# Patient Record
Sex: FEMALE
[De-identification: names, ages, dates, MRNs, and addresses within clinical notes are randomized per-mention and may not be internally consistent; named-entity substitution may affect disease eponyms.]

---

## 2018-06-12 ENCOUNTER — HOSPITAL ENCOUNTER (EMERGENCY)
Dept: HOSPITAL 42 - ED | Age: 48
LOS: 1 days | Discharge: ANOTHER HOSPITAL | End: 2018-06-13
Payer: MEDICAID

## 2018-06-12 VITALS — BODY MASS INDEX: 25.2 KG/M2

## 2018-06-12 DIAGNOSIS — F31.9: Primary | ICD-10-CM

## 2018-06-12 DIAGNOSIS — F17.210: ICD-10-CM

## 2018-06-12 LAB
ALBUMIN SERPL-MCNC: 4.3 G/DL (ref 3–4.8)
ALBUMIN/GLOB SERPL: 1.6 {RATIO} (ref 1.1–1.8)
ALT SERPL-CCNC: 28 U/L (ref 7–56)
APAP SERPL-MCNC: < 10 UG/ML (ref 10–20)
APPEARANCE UR: CLEAR
AST SERPL-CCNC: 21 U/L (ref 14–36)
BACTERIA #/AREA URNS HPF: (no result) /[HPF]
BASOPHILS # BLD AUTO: 0 K/MM3 (ref 0–2)
BASOPHILS NFR BLD: 0 % (ref 0–3)
BILIRUB UR-MCNC: (no result) MG/DL
BUN SERPL-MCNC: 10 MG/DL (ref 7–21)
CALCIUM SERPL-MCNC: 9.1 MG/DL (ref 8.4–10.5)
COLOR UR: YELLOW
EOSINOPHIL # BLD: 0 10*3/UL (ref 0–0.7)
EOSINOPHIL NFR BLD: 0.8 % (ref 1.5–5)
ERYTHROCYTE [DISTWIDTH] IN BLOOD BY AUTOMATED COUNT: 12.9 % (ref 11.5–14.5)
GFR NON-AFRICAN AMERICAN: > 60
GLUCOSE UR STRIP-MCNC: NEGATIVE MG/DL
GRANULOCYTES # BLD: 3.19 10*3/UL (ref 1.4–6.5)
GRANULOCYTES NFR BLD: 61 % (ref 50–68)
HCG,QUALITATIVE URINE: NEGATIVE
HGB BLD-MCNC: 12.4 G/DL (ref 12–16)
LEUKOCYTE ESTERASE UR-ACNC: NEGATIVE LEU/UL
LYMPHOCYTES # BLD: 1.5 10*3/UL (ref 1.2–3.4)
LYMPHOCYTES NFR BLD AUTO: 28 % (ref 22–35)
MCH RBC QN AUTO: 29.6 PG (ref 25–35)
MCHC RBC AUTO-ENTMCNC: 35.1 G/DL (ref 31–37)
MCV RBC AUTO: 84.2 FL (ref 80–105)
MONOCYTES # BLD AUTO: 0.5 10*3/UL (ref 0.1–0.6)
MONOCYTES NFR BLD: 10.2 % (ref 1–6)
PH UR STRIP: 6 [PH] (ref 4.7–8)
PLATELET # BLD: 241 10^3/UL (ref 120–450)
PMV BLD AUTO: 10 FL (ref 7–11)
PROT UR STRIP-MCNC: (no result) MG/DL
RBC # BLD AUTO: 4.19 10^6/UL (ref 3.5–6.1)
RBC # UR STRIP: NEGATIVE /UL
RBC #/AREA URNS HPF: (no result) /HPF (ref 0–2)
SALICYLATES SERPL-MCNC: < 1 MG/DL (ref 2–20)
SP GR UR STRIP: >= 1.03 (ref 1–1.03)
UROBILINOGEN UR STRIP-ACNC: 0.2 E.U./DL
WBC # BLD AUTO: 5.2 10^3/UL (ref 4.5–11)
WBC #/AREA URNS HPF: (no result) /HPF (ref 0–6)

## 2018-06-12 PROCEDURE — 80329 ANALGESICS NON-OPIOID 1 OR 2: CPT

## 2018-06-12 PROCEDURE — 93005 ELECTROCARDIOGRAM TRACING: CPT

## 2018-06-12 PROCEDURE — 96372 THER/PROPH/DIAG INJ SC/IM: CPT

## 2018-06-12 PROCEDURE — 71045 X-RAY EXAM CHEST 1 VIEW: CPT

## 2018-06-12 PROCEDURE — 83992 ASSAY FOR PHENCYCLIDINE: CPT

## 2018-06-12 PROCEDURE — 80053 COMPREHEN METABOLIC PANEL: CPT

## 2018-06-12 PROCEDURE — 80345 DRUG SCREENING BARBITURATES: CPT

## 2018-06-12 PROCEDURE — 83735 ASSAY OF MAGNESIUM: CPT

## 2018-06-12 PROCEDURE — 82550 ASSAY OF CK (CPK): CPT

## 2018-06-12 PROCEDURE — 80324 DRUG SCREEN AMPHETAMINES 1/2: CPT

## 2018-06-12 PROCEDURE — 80361 OPIATES 1 OR MORE: CPT

## 2018-06-12 PROCEDURE — 80320 DRUG SCREEN QUANTALCOHOLS: CPT

## 2018-06-12 PROCEDURE — 80353 DRUG SCREENING COCAINE: CPT

## 2018-06-12 PROCEDURE — 99285 EMERGENCY DEPT VISIT HI MDM: CPT

## 2018-06-12 PROCEDURE — 80358 DRUG SCREENING METHADONE: CPT

## 2018-06-12 PROCEDURE — 90791 PSYCH DIAGNOSTIC EVALUATION: CPT

## 2018-06-12 PROCEDURE — 80349 CANNABINOIDS NATURAL: CPT

## 2018-06-12 PROCEDURE — 85025 COMPLETE CBC W/AUTO DIFF WBC: CPT

## 2018-06-12 PROCEDURE — 80346 BENZODIAZEPINES1-12: CPT

## 2018-06-12 PROCEDURE — 84703 CHORIONIC GONADOTROPIN ASSAY: CPT

## 2018-06-12 PROCEDURE — 81001 URINALYSIS AUTO W/SCOPE: CPT

## 2018-06-12 NOTE — ED PDOC
Arrival/HPI





- General


Chief Complaint: Psychiatric Evaluation


Time Seen by Provider: 06/12/18 10:15





- History of Present Illness


Narrative History of Present Illness (Text): 





06/12/18 10:42


49 yo F with PMH of endometrial CA, skin CA, opioid abuse on suboxone, and IBS 

presents to emergency department accompanied by aKtt WARREN and EMS after being 

found trespassing. Patient states that she's had multiple stressors in her life 

including poor job stability and tension between her kids. Patient denies any 

suicidal/homicidal ideation or visual/auditory hallucinations. Patient states 

that she had not slept for 3 days and was about to come to the hospital because 

she wanted to get some sleep. Before her partner could drive her to the hospital

, she ran off and was found trespassing and being disruptive in a laundry room. 

Patient denies chest pain, shortness of breath nausea, vomiting, diarrhea, 

abdominal pain, fever, chills, headache, or dizziness.





PMD: Fili Guzman)





Past Medical History





- Infectious Disease


Hx of Infectious Diseases: None





- Cardiac


Hx Cardiac Disorders: No





- Pulmonary


Hx Respiratory Disorders: No





- Neurological


Hx Neurological Disorder: No





- HEENT


Hx HEENT Disorder: No





- Renal


Hx Renal Disorder: No





- Endocrine/Metabolic


Hx Endocrine Disorders: No





- Hematological/Oncological


Hx Blood Disorders: No





- Integumentary


Hx Dermatological Disorder: Yes


Other/Comment: Skin Cancer





- Musculoskeletal/Rheumatological


Hx Musculoskeletal Disorders: Yes


Hx Back Pain: Yes


Other/Comment: "bad knees"





- Gastrointestinal


Hx Gastrointestinal Disorders: Yes


Hx Diverticulitis: Yes





- Genitourinary/Gynecological


Hx Genitourinary Disorders: Yes


Other/Comment: "Weak Bladder".  Endometrial Cancer





- Psychiatric


Hx Psychophysiologic Disorder: Yes


Hx Anxiety: Yes


Hx Depression: Yes


Hx Panic Disorder: Yes (Panic Attack)


Hx Substance Use: No





- Surgical History


Hx Cholecystectomy: Yes


Other/Comment: Right knee sx





- Anesthesia


Hx Anesthesia: Yes


Hx Anesthesia Reactions: No


Hx Malignant Hyperthermia: No





Family/Social History


Family/Social History: Other (depression/anxiety)


Smoking Status: Light Smoker < 10 Cigarettes Daily


Hx Alcohol Use: No


Hx Substance Use: No





Allergies/Home Meds


Allergies/Adverse Reactions: 


Allergies





No Known Allergies Allergy (Verified 06/19/16 02:42)


 








Home Medications: 


 Home Meds











 Medication  Instructions  Recorded  Confirmed


 


Buprenorphine HCl/Naloxone HCl 1 film PO DAILY 06/19/16 06/12/18





[Suboxone 2 mg-0.5 mg Sl Film]   


 


ALPRAZolam [Xanax] 1 mg PO TID PRN 06/12/18 06/12/18


 


Linaclotide [Linzess] 290 mcg PO DAILY 06/12/18 06/12/18


 


Tolterodine Tartrate [Detrol LA] 4 mg PO DAILY 06/12/18 06/12/18














Review of Systems





- Physician Review


All systems were reviewed & negative as marked: Yes (12 point ROS reviewed and 

is negative other than what is stated in HPI.)





Physical Exam


Vital Signs Reviewed: Yes


Temperature: Afebrile


Blood Pressure: Normal


Pulse: Regular


Respiratory Rate: Normal


Appearance: Positive for: Non-Toxic


Pain Distress: None


Mental Status: Positive for: Alert and Oriented X 3, Agitated





- Systems Exam


Head: Present: Atraumatic, Normocephalic


Pupils: Present: PERRL


Extroacular Muscles: Present: EOMI


Conjunctiva: Present: Normal


Mouth: Present: Moist Mucous Membranes


Neck: Present: Normal Range of Motion


Respiratory/Chest: Present: Clear to Auscultation.  No: Wheezes, Rales, Rhonchi


Cardiovascular: Present: Regular Rate and Rhythm, Normal S1, S2.  No: Murmurs, 

Rub, Gallop


Abdomen: No: Tenderness, Distention, Rebound, Guarding


Upper Extremity: Present: Normal Inspection.  No: Cyanosis, Edema


Lower Extremity: Present: Normal Inspection.  No: Edema


Neurological: Present: GCS=15, CN II-XII Intact, Speech Normal


Skin: Present: Warm, Dry, Normal Color.  No: Rashes


Psychiatric: Present: Alert, Oriented x 3, Agitated, Depressed Mood, Other (

Tangential thinking).  No: Normal Insight, Normal Concentration, Suicidal 

Ideation, Homicidal Ideation, Hallucinations


Vital Signs











  Temp Pulse Resp BP Pulse Ox


 


 06/13/18 20:20   80  17  110/70  100


 


 06/13/18 18:34  97.9 F    


 


 06/13/18 18:28   98 H  18  98/78 L  99


 


 06/13/18 04:32   61  16  117/71  100


 


 06/13/18 01:58   60  12  112/62  100


 


 06/12/18 19:33   83  18  121/73  99


 


 06/12/18 17:50   74  18  122/68  99


 


 06/12/18 15:39   72  18  128/74  100


 


 06/12/18 13:00   75  18  121/71  100


 


 06/12/18 10:22  98.4 F  89  18  119/65  100














Medical Decision Making


ED Course and Treatment: 








06/12/18 18:04


Patient seen and evaluated with medical resident. I examined patient and 

reviewed history with patient and consults.


Patient admits to me "I admit I used some ecstasy". She denies headache or 

chest pain or shortness of breath. She is afebrile. She denies dysuria or 

frequency.


With patient's permission, patient's significant other brought patient to 

Emergency department and history reviewed with him. Patient states to me that 

"my daughter called the police I'm not sure why". It is reported that she has 

been agitated and intermittently aggressive and pressure.


Throughout Emergency department stay she is noted to be intermittently 

screaming at staff and entering offices despite being verbally updated on 

treatment plan which includes mental health evaluation and screening given 

history of agitation and aggressive behavior. I re-evaluated patient at 1745 

and was able to speak to her, she became more calm and cooperative, RESTRAINTS 

AT THIS TIME WERE THUS NOT PLACED, although will continue one to one for 

reported aggressive behavior and agitation, potential harm to self in agitated 

state.





Case to be endorsed to oncoming Emergency department physician for follow-up of 

screener recommendations and final disposition. (Bindu Graham)





06/12/18 10:56


49 yo F presents to emergency department for psychiatric evaluation.





Plan:


- CBC, CMP


- EtOH level


- CPK


- ASA, tylenol level


- UDS


- UA


- Urine pregnancy


- Chest X-ray


- EKG


- Reassess and disposition





EKG reviewed and shoed rate of 81. NSR. Left axis deviation. Inferior infarct 

age undetermined





06/12/18 11:21


Potassium 3.3, repleted with PO KCl.





CXR reviewed by radiologist showed no active disease.





06/12/18 14:24


Patient is medically cleared for psych admission and transfer. 





[facility] evaluated patient and is candidate for admission, however, patient 

currently refusing voluntary psychiatric admission. Information faxed to [facility] for evaluation for involuntary psychiatric admission.





06/12/18 17:41


Patient starting to become agitated and aggressive and was continuously pacing 

around the emergency department. Patient began entering restricted areas and 

was asked to remain in her room. However, patient was non-compliant. Thus, 

restraints and 1:1 ordered.


 (Fili Cross)





- Lab Interpretations


Lab Results: 








 06/12/18 10:45 





 06/12/18 10:45 





 Lab Results





06/12/18 11:05: Urine Opiates Screen Negative, Urine Methadone Screen Negative, 

Ur Barbiturates Screen Negative, Ur Phencyclidine Scrn Negative, Ur 

Amphetamines Screen Positive H, U Benzodiazepines Scrn Positive H, U Oth 

Cocaine Metabols Negative, U Cannabinoids Screen Negative


06/12/18 11:05: Urine Color Yellow, Urine Appearance Clear, Urine pH 6.0, Ur 

Specific Gravity >= 1.030, Urine Protein Trace H, Urine Glucose (UA) Negative, 

Urine Ketones 40 H, Urine Blood Negative, Urine Nitrate Negative, Urine 

Bilirubin Small H, Urine Urobilinogen 0.2, Ur Leukocyte Esterase Negative, 

Urine RBC 0 - 2, Urine WBC 0 - 2, Ur Epithelial Cells 1 - 3, Urine Bacteria Mod

, Urine HCG, Qual Negative


06/12/18 10:45: Alcohol, Quantitative < 10


06/12/18 10:45: Salicylates < 1 L, Acetaminophen < 10.0 L


06/12/18 10:45: Sodium 140, Potassium 3.3 L, Chloride 102, Carbon Dioxide 26, 

Anion Gap 16, BUN 10, Creatinine 0.6 L, Est GFR ( Amer) > 60, Est GFR (

Non-Af Amer) > 60, Random Glucose 113 H, Calcium 9.1, Magnesium 1.7, Total 

Bilirubin 1.2, AST 21, ALT 28, Alkaline Phosphatase 65, Total Creatine Kinase 86

, Total Protein 6.9, Albumin 4.3, Globulin 2.7, Albumin/Globulin Ratio 1.6


06/12/18 10:45: WBC 5.2, RBC 4.19, Hgb 12.4, Hct 35.3 L, MCV 84.2, MCH 29.6, 

MCHC 35.1, RDW 12.9, Plt Count 241, MPV 10.0, Gran % 61.0, Lymph % (Auto) 28.0, 

Mono % (Auto) 10.2 H, Eos % (Auto) 0.8 L, Baso % (Auto) 0.0, Gran # 3.19, Lymph 

# (Auto) 1.5, Mono # (Auto) 0.5, Eos # (Auto) 0.0, Baso # (Auto) 0.00











- RAD Interpretation


Radiology Orders: 








06/12/18 10:50


CHEST PORTABLE [RAD] Stat 














- Medication Orders


Current Medication Orders: 











Discontinued Medications





Diphenhydramine HCl (Benadryl)  50 mg IM Q6H PRN


   PRN Reason: agitation/aggression


Haloperidol Lactate (Haldol)  5 mg IM STAT STA


   PRN Reason: Protocol


   Stop: 06/13/18 04:54


   Last Admin: 06/13/18 05:40  Dose: 5 mg





IM Administration Charges


 Document     06/13/18 05:40  CNR  (Rec: 06/13/18 05:40  CNR  [id number])


     Injection Site


      MAR Injection Site                         Right Deltoid


     Charges for Administration


      # of IM Administrations                    1





Haloperidol Lactate (Haldol)  5 mg IM Q6H PRN; Protocol


   PRN Reason: agitation/psychosis


Lorazepam (Ativan)  2 mg IM ONCE ONE


   Stop: 06/13/18 04:53


   Last Admin: 06/13/18 05:40  Dose: 2 mg





IM Administration Charges


 Document     06/13/18 05:40  CNR  (Rec: 06/13/18 05:40  CNR  [id number])


     Injection Site


      MAR Injection Site                         Right Deltoid


     Charges for Administration


      # of IM Administrations                    1





Lorazepam (Ativan)  2 mg IM Q6 PRN; Protocol


   PRN Reason: Agitation


Potassium Chloride (K-Dur 20 Meq Er Tab)  40 meq PO STAT STA


   Stop: 06/12/18 11:19


   Last Admin: 06/12/18 13:26  Dose: 40 meq





Ziprasidone (Geodon Inj)  20 mg IM ONCE STA


   Stop: 06/12/18 20:43


   Last Admin: 06/12/18 20:48  Dose: 20 mg





IM Administration Charges


 Document     06/12/18 20:48  CNR  (Rec: 06/12/18 20:48  CNR  [id number])


     Injection Site


      MAR Injection Site                         Left Deltoid


     Charges for Administration


      # of IM Administrations                    1














Disposition/Present on Arrival





- Present on Arrival


Any Indicators Present on Arrival: No


History of DVT/PE: No


History of Uncontrolled Diabetes: No


Urinary Catheter: No


History of Decub. Ulcer: No


History Surgical Site Infection Following: None





- Disposition


Have Diagnosis and Disposition been Completed?: Yes


Disposition Time: 19:00





- Disposition


Diagnosis: 


 Bipolar disorder





Disposition: Transfer [facility]


Condition: STABLE


Forms:  [facility] (English)

## 2018-06-12 NOTE — CARD
--------------- APPROVED REPORT --------------





EKG Measurement

Heart [id number]

PA [id number]

[id number]

[id number]

[id number]



<Conclusion>

Normal sinus rhythm

Left axis deviation

Inferior infarct, age undetermined

Abnormal ECG

## 2018-06-12 NOTE — RAD
HISTORY:

medical screening  



COMPARISON:

08/05/2016 



FINDINGS:



LUNGS:

No active pulmonary disease.



PLEURA:

No significant pleural effusion identified, no pneumothorax apparent.



CARDIOVASCULAR:

Normal.



OSSEOUS STRUCTURES:

No significant abnormalities.



VISUALIZED UPPER ABDOMEN:

Normal.



OTHER FINDINGS:

None.



IMPRESSION:

No active disease.

## 2018-06-12 NOTE — ED PDOC
Arrival/HPI





- General


Time Seen by Provider: 06/12/18 10:15


Historian: Patient





Past Medical History





- Provider Review


Nursing Documentation Reviewed: Yes





- Infectious Disease


Hx of Infectious Diseases: None





- Musculoskeletal/Rheumatological


Hx Back Pain: Yes


Other/Comment: "bad knees"





- Gastrointestinal


Hx Diverticulitis: Yes





- Genitourinary/Gynecological


Other/Comment: "Weak Bladder"





- Psychiatric


Hx Substance Use: No





- Surgical History


Hx Cholecystectomy: Yes


Other/Comment: Right knee sx





- Anesthesia


Hx Anesthesia: Yes


Hx Anesthesia Reactions: No


Hx Malignant Hyperthermia: No





Family/Social History





- Physician Review


Nursing Documentation Reviewed: Yes


Family/Social History: Unknown Family HX


Smoking Status: Light Smoker < 10 Cigarettes Daily


Hx Alcohol Use: No


Hx Substance Use: No





Allergies/Home Meds


Allergies/Adverse Reactions: 


Allergies





No Known Allergies Allergy (Verified 06/19/16 02:42)


 








Home Medications: 


 Home Meds











 Medication  Instructions  Recorded  Confirmed


 


Buprenorphine HCl/Naloxone HCl 1 film PO DAILY 06/19/16 06/19/16





[Suboxone 2 mg-0.5 mg Sl Film]   


 


Mirabegron [Myrbetriq] 1 tab PO DAILY 06/19/16 06/19/16


 


clonazePAM [Klonopin] 1 tab PO BID 06/19/16 06/19/16














Physical Exam





- Physical Exam


Narrative Physical Exam (Text): 





06/12/18


Head:  Atraumatic.  Normocephalic. 


Eyes:  PERRL.  EOMI.  Conjunctivae are not pale.


ENT:  Mucous membranes are moist and intact.  Oropharynx is clear and 

symmetric. 


Neck:  Supple.  Full ROM.  No JVD.  No lymphadenopathy.


Cardiovascular:  Regular rate.  Regular rhythm.  No murmurs, rubs, or gallops.  

Distal pulses are 2+ and symmetric.


Pulmonary/Chest:  No evidence of respiratory distress.  Clear to auscultation 

bilaterally.  No wheezing, rales or rhonchi.


Abdominal:  Soft and non-distended.  There is no tenderness.  No rebound, 

guarding, or rigidity.  No organomegaly.  Good bowel sounds. 


Back:  No CVA tenderness.


Extremities:  No edema.   No cyanosis.  No clubbing.  Full range of motion in 

all extremities.  No calf tenderness.


Skin:  Skin is warm and dry.  No petechiae.  No purpura. 


Neurological:  Alert, awake, and oriented to person, place, time, and 

situation.  Normal speech. 


Psychiatric:  Good eye contact.  Normal interaction, affect, and behavior.


 





- Scribe Statement


The provider has reviewed the documentation as recorded by the Erickaibe


Sun Ugarte





Provider Scribe Attestation:


All medical record entries made by the Scribe were at my direction and 

personally dictated by me. I have reviewed the chart and agree that the record 

accurately reflects my personal performance of the history, physical exam, 

medical decision making, and the department course for this patient. I have 

also personally directed, reviewed, and agree with the discharge instructions 

and disposition.





Disposition/Present on Arrival





- Present on Arrival


History of DVT/PE: No


History of Uncontrolled Diabetes: No


Urinary Catheter: No


History Surgical Site Infection Following: None





- Disposition

## 2018-06-12 NOTE — ED PDOC
Physical Exam


Vital Signs











  Temp Pulse Resp BP Pulse Ox


 


 06/13/18 04:32   61  16  117/71  100


 


 06/13/18 01:58   60  12  112/62  100


 


 06/12/18 19:33   83  18  121/73  99


 


 06/12/18 17:50   74  18  122/68  99


 


 06/12/18 15:39   72  18  128/74  100


 


 06/12/18 13:00   75  18  121/71  100


 


 06/12/18 10:22  98.4 F  89  18  119/65  100














Medical Decision Making


ED Course and Treatment: 


06/12/18 19:25


Patient endorsed to me by . Pending evaluation by [facility] screeners. Patient 

apparently presented to emergency department post ecstasy use. Patient 

apparently was found trespassing and exhibiting disruptive behaviour. Patient 

was medically cleared and was seen by [facility] who deferred to [facility] screeners. 

Patient is currently resting comfortably and under evaluation by [facility] 

screeners. 





06/12/18 20:43


Patient was accepted by [facility] for transfer.  Patient became belligerent and 

aggressive to ER staff, attempting to abscond.  patient is screaming, yelling, 

and swinging at medical staff requiring her to be restrained. Sedation also 

ordered for the patient.





06/12/18 21:39


Pt. is awaiting bed availability at [facility].Currently remains sedated.





06/12/19 05:00


Pt. became agitated,yelling and cursing ER staff.Unable to verbally reason with 

patient she was administered sedation. 





06/13/18 07:00


Patient resting comfortably.Endorsed to oncoming attending /awaiting bed 

availability at [facility]











- Lab Interpretations


Lab Results: 








 06/12/18 10:45 





 06/12/18 10:45 





 Lab Results





06/12/18 11:05: Urine Opiates Screen Negative, Urine Methadone Screen Negative, 

Ur Barbiturates Screen Negative, Ur Phencyclidine Scrn Negative, Ur 

Amphetamines Screen Positive H, U Benzodiazepines Scrn Positive H, U Oth 

Cocaine Metabols Negative, U Cannabinoids Screen Negative


06/12/18 11:05: Urine Color Yellow, Urine Appearance Clear, Urine pH 6.0, Ur 

Specific Gravity >= 1.030, Urine Protein Trace H, Urine Glucose (UA) Negative, 

Urine Ketones 40 H, Urine Blood Negative, Urine Nitrate Negative, Urine 

Bilirubin Small H, Urine Urobilinogen 0.2, Ur Leukocyte Esterase Negative, 

Urine RBC 0 - 2, Urine WBC 0 - 2, Ur Epithelial Cells 1 - 3, Urine Bacteria Mod

, Urine HCG, Qual Negative


06/12/18 10:45: Alcohol, Quantitative < 10


06/12/18 10:45: Salicylates < 1 L, Acetaminophen < 10.0 L


06/12/18 10:45: Sodium 140, Potassium 3.3 L, Chloride 102, Carbon Dioxide 26, 

Anion Gap 16, BUN 10, Creatinine 0.6 L, Est GFR ( Amer) > 60, Est GFR (

Non-Af Amer) > 60, Random Glucose 113 H, Calcium 9.1, Magnesium 1.7, Total 

Bilirubin 1.2, AST 21, ALT 28, Alkaline Phosphatase 65, Total Creatine Kinase 86

, Total Protein 6.9, Albumin 4.3, Globulin 2.7, Albumin/Globulin Ratio 1.6


06/12/18 10:45: WBC 5.2, RBC 4.19, Hgb 12.4, Hct 35.3 L, MCV 84.2, MCH 29.6, 

MCHC 35.1, RDW 12.9, Plt Count 241, MPV 10.0, Gran % 61.0, Lymph % (Auto) 28.0, 

Mono % (Auto) 10.2 H, Eos % (Auto) 0.8 L, Baso % (Auto) 0.0, Gran # 3.19, Lymph 

# (Auto) 1.5, Mono # (Auto) 0.5, Eos # (Auto) 0.0, Baso # (Auto) 0.00











- RAD Interpretation


Radiology Orders: 








06/12/18 10:50


CHEST PORTABLE [RAD] Stat 














- Medication Orders


Current Medication Orders: 











Discontinued Medications





Haloperidol Lactate (Haldol)  5 mg IM STAT STA


   PRN Reason: Protocol


   Stop: 06/13/18 04:54


   Last Admin: 06/13/18 05:40  Dose: 5 mg





IM Administration Charges


 Document     06/13/18 05:40  CNR  (Rec: 06/13/18 05:40  CNR  [id number])


     Injection Site


      MAR Injection Site                         Right Deltoid


     Charges for Administration


      # of IM Administrations                    1





Lorazepam (Ativan)  2 mg IM ONCE ONE


   Stop: 06/13/18 04:53


   Last Admin: 06/13/18 05:40  Dose: 2 mg





IM Administration Charges


 Document     06/13/18 05:40  CNR  (Rec: 06/13/18 05:40  CNR  [id number])


     Injection Site


      MAR Injection Site                         Right Deltoid


     Charges for Administration


      # of IM Administrations                    1





Potassium Chloride (K-Dur 20 Meq Er Tab)  40 meq PO STAT STA


   Stop: 06/12/18 11:19


   Last Admin: 06/12/18 13:26  Dose: 40 meq





Ziprasidone (Geodon Inj)  20 mg IM ONCE STA


   Stop: 06/12/18 20:43


   Last Admin: 06/12/18 20:48  Dose: 20 mg





IM Administration Charges


 Document     06/12/18 20:48  CNR  (Rec: 06/12/18 20:48  CNR  [id number])


     Injection Site


      MAR Injection Site                         Left Deltoid


     Charges for Administration


      # of IM Administrations                    1














- Scribe Statement


The provider has reviewed the documentation as recorded by the Scribe


Louie Diaz


Provider Scribe Attestation:


All medical record entries made by the Scribe were at my direction and 

personally dictated by me. I have reviewed the chart and agree that the record 

accurately reflects my personal performance of the history, physical exam, 

medical decision making, and the department course for this patient. I have 

also personally directed, reviewed, and agree with the discharge instructions 

and disposition. 





Disposition/Present on Arrival





- Present on Arrival


Any Indicators Present on Arrival: No


History of DVT/PE: No


History of Uncontrolled Diabetes: No


Urinary Catheter: No


History of Decub. Ulcer: No


History Surgical Site Infection Following: None





- Disposition


Have Diagnosis and Disposition been Completed?: No


Diagnosis: 


 Bipolar disorder





Disposition Time: 07:00


Patient Problems: 


 Current Active Problems











Problem Status Onset


 


Bipolar disorder Acute  











Condition: STABLE


Forms:  [facility] (English)

## 2018-06-13 VITALS
SYSTOLIC BLOOD PRESSURE: 110 MMHG | HEART RATE: 80 BPM | DIASTOLIC BLOOD PRESSURE: 70 MMHG | RESPIRATION RATE: 17 BRPM | OXYGEN SATURATION: 100 %

## 2018-06-13 VITALS — TEMPERATURE: 97.9 F

## 2018-06-13 NOTE — ED PDOC
Physical Exam


Vital Signs











  Temp Pulse Resp BP Pulse Ox


 


 06/13/18 18:34  97.9 F    


 


 06/13/18 18:28   98 H  18  98/78 L  99


 


 06/13/18 04:32   61  16  117/71  100


 


 06/13/18 01:58   60  12  112/62  100


 


 06/12/18 19:33   83  18  121/73  99


 


 06/12/18 17:50   74  18  122/68  99


 


 06/12/18 15:39   72  18  128/74  100


 


 06/12/18 13:00   75  18  121/71  100


 


 06/12/18 10:22  98.4 F  89  18  119/65  100














Medical Decision Making


ED Course and Treatment: 


06/13/18 20:12


Patient endorsed back to me from . Patient has a history of bipolar 

disorder and is still awaiting bed availability at [facility].  Patient remains 

stable and resting comfortably.








- Lab Interpretations


Lab Results: 








 06/12/18 10:45 





 06/12/18 10:45 





 Lab Results





06/12/18 11:05: Urine Opiates Screen Negative, Urine Methadone Screen Negative, 

Ur Barbiturates Screen Negative, Ur Phencyclidine Scrn Negative, Ur 

Amphetamines Screen Positive H, U Benzodiazepines Scrn Positive H, U Oth 

Cocaine Metabols Negative, U Cannabinoids Screen Negative


06/12/18 11:05: Urine Color Yellow, Urine Appearance Clear, Urine pH 6.0, Ur 

Specific Gravity >= 1.030, Urine Protein Trace H, Urine Glucose (UA) Negative, 

Urine Ketones 40 H, Urine Blood Negative, Urine Nitrate Negative, Urine 

Bilirubin Small H, Urine Urobilinogen 0.2, Ur Leukocyte Esterase Negative, 

Urine RBC 0 - 2, Urine WBC 0 - 2, Ur Epithelial Cells 1 - 3, Urine Bacteria Mod

, Urine HCG, Qual Negative


06/12/18 10:45: Alcohol, Quantitative < 10


06/12/18 10:45: Salicylates < 1 L, Acetaminophen < 10.0 L


06/12/18 10:45: Sodium 140, Potassium 3.3 L, Chloride 102, Carbon Dioxide 26, 

Anion Gap 16, BUN 10, Creatinine 0.6 L, Est GFR ( Amer) > 60, Est GFR (

Non-Af Amer) > 60, Random Glucose 113 H, Calcium 9.1, Magnesium 1.7, Total 

Bilirubin 1.2, AST 21, ALT 28, Alkaline Phosphatase 65, Total Creatine Kinase 86

, Total Protein 6.9, Albumin 4.3, Globulin 2.7, Albumin/Globulin Ratio 1.6


06/12/18 10:45: WBC 5.2, RBC 4.19, Hgb 12.4, Hct 35.3 L, MCV 84.2, MCH 29.6, 

MCHC 35.1, RDW 12.9, Plt Count 241, MPV 10.0, Gran % 61.0, Lymph % (Auto) 28.0, 

Mono % (Auto) 10.2 H, Eos % (Auto) 0.8 L, Baso % (Auto) 0.0, Gran # 3.19, Lymph 

# (Auto) 1.5, Mono # (Auto) 0.5, Eos # (Auto) 0.0, Baso # (Auto) 0.00











- RAD Interpretation


Radiology Orders: 








06/12/18 10:50


CHEST PORTABLE [RAD] Stat 














- Medication Orders


Current Medication Orders: 








Diphenhydramine HCl (Benadryl)  50 mg IM Q6H PRN


   PRN Reason: agitation/aggression


Haloperidol Lactate (Haldol)  5 mg IM Q6H PRN; Protocol


   PRN Reason: agitation/psychosis


Lorazepam (Ativan)  2 mg IM Q6 PRN; Protocol


   PRN Reason: Agitation





Discontinued Medications





Haloperidol Lactate (Haldol)  5 mg IM STAT STA


   PRN Reason: Protocol


   Stop: 06/13/18 04:54


   Last Admin: 06/13/18 05:40  Dose: 5 mg





IM Administration Charges


 Document     06/13/18 05:40  CNR  (Rec: 06/13/18 05:40  CNR  [id number])


     Injection Site


      MAR Injection Site                         Right Deltoid


     Charges for Administration


      # of IM Administrations                    1





Lorazepam (Ativan)  2 mg IM ONCE ONE


   Stop: 06/13/18 04:53


   Last Admin: 06/13/18 05:40  Dose: 2 mg





IM Administration Charges


 Document     06/13/18 05:40  CNR  (Rec: 06/13/18 05:40  CNR  [id number])


     Injection Site


      MAR Injection Site                         Right Deltoid


     Charges for Administration


      # of IM Administrations                    1





Potassium Chloride (K-Dur 20 Meq Er Tab)  40 meq PO STAT STA


   Stop: 06/12/18 11:19


   Last Admin: 06/12/18 13:26  Dose: 40 meq





Ziprasidone (Geodon Inj)  20 mg IM ONCE STA


   Stop: 06/12/18 20:43


   Last Admin: 06/12/18 20:48  Dose: 20 mg





IM Administration Charges


 Document     06/12/18 20:48  CNR  (Rec: 06/12/18 20:48  CNR  [id number])


     Injection Site


      MAR Injection Site                         Left Deltoid


     Charges for Administration


      # of IM Administrations                    1














- Scribe Statement


The provider has reviewed the documentation as recorded by the Scribe


Louie Diaz


Provider Scribe Attestation:


All medical record entries made by the Scribe were at my direction and 

personally dictated by me. I have reviewed the chart and agree that the record 

accurately reflects my personal performance of the history, physical exam, 

medical decision making, and the department course for this patient. I have 

also personally directed, reviewed, and agree with the discharge instructions 

and disposition. 





Disposition/Present on Arrival





- Present on Arrival


Any Indicators Present on Arrival: No


History of DVT/PE: No


History of Uncontrolled Diabetes: No


Urinary Catheter: No


History of Decub. Ulcer: No


History Surgical Site Infection Following: None





- Disposition


Have Diagnosis and Disposition been Completed?: Yes


Diagnosis: 


 Bipolar disorder





Disposition: Transfer [facility]


Disposition Time: 20:32


Patient Problems: 


 Current Active Problems











Problem Status Onset


 


Bipolar disorder Acute  











Condition: STABLE


Forms:  [employer] (English)

## 2018-06-13 NOTE — ED PDOC
Physical Exam





- Physical Exam


Narrative Physical Exam (Text): 





06/13/18 07:10








Vital Signs Reviewed: Yes


Vital Signs











  Temp Pulse Resp BP Pulse Ox


 


 06/13/18 04:32   61  16  117/71  100


 


 06/13/18 01:58   60  12  112/62  100


 


 06/12/18 19:33   83  18  121/73  99


 


 06/12/18 17:50   74  18  122/68  99


 


 06/12/18 15:39   72  18  128/74  100


 


 06/12/18 13:00   75  18  121/71  100


 


 06/12/18 10:22  98.4 F  89  18  119/65  100











Temperature: Afebrile


Blood Pressure: Normal


Pulse: Regular


Respiratory Rate: Normal


Appearance: Positive for: Non-Toxic, Comfortable


Pain Distress: None


Mental Status: Positive for: other (alert, conversive)





Medical Decision Making


ED Course and Treatment: 





06/13/18 07:10





Plan:


-- Awaiting bed availability at [facility] 


-- Reassess and disposition





Prior Visits:


Notes and results from previous visits were reviewed. 





Progress Notes:


 


06/13/18 12:41


Upon reassessment, patient is resting comfortably and is cooperative. Patient 

denies any pain or discomfort. Patient has eaten and has been updated on 

treatment plan. She has also been reassessed by psychiatrist in the emergency 

department. 


06/13/18 16:23


Patient resting. Will stand up and ambulate occasionally. Cooperative. No pain 

or discomfort reported. No respiratory distress noted. 


Awaiting transfer.


Patient endorsed to Dr. Goins at 1900 pending transfer.








- Lab Interpretations


Lab Results: 








 06/12/18 10:45 





 06/12/18 10:45 





 Lab Results





06/12/18 11:05: Urine Opiates Screen Negative, Urine Methadone Screen Negative, 

Ur Barbiturates Screen Negative, Ur Phencyclidine Scrn Negative, Ur 

Amphetamines Screen Positive H, U Benzodiazepines Scrn Positive H, U Oth 

Cocaine Metabols Negative, U Cannabinoids Screen Negative


06/12/18 11:05: Urine Color Yellow, Urine Appearance Clear, Urine pH 6.0, Ur 

Specific Gravity >= 1.030, Urine Protein Trace H, Urine Glucose (UA) Negative, 

Urine Ketones 40 H, Urine Blood Negative, Urine Nitrate Negative, Urine 

Bilirubin Small H, Urine Urobilinogen 0.2, Ur Leukocyte Esterase Negative, 

Urine RBC 0 - 2, Urine WBC 0 - 2, Ur Epithelial Cells 1 - 3, Urine Bacteria Mod

, Urine HCG, Qual Negative


06/12/18 10:45: Alcohol, Quantitative < 10


06/12/18 10:45: Salicylates < 1 L, Acetaminophen < 10.0 L


06/12/18 10:45: Sodium 140, Potassium 3.3 L, Chloride 102, Carbon Dioxide 26, 

Anion Gap 16, BUN 10, Creatinine 0.6 L, Est GFR ( Amer) > 60, Est GFR (

Non-Af Amer) > 60, Random Glucose 113 H, Calcium 9.1, Magnesium 1.7, Total 

Bilirubin 1.2, AST 21, ALT 28, Alkaline Phosphatase 65, Total Creatine Kinase 86

, Total Protein 6.9, Albumin 4.3, Globulin 2.7, Albumin/Globulin Ratio 1.6


06/12/18 10:45: WBC 5.2, RBC 4.19, Hgb 12.4, Hct 35.3 L, MCV 84.2, MCH 29.6, 

MCHC 35.1, RDW 12.9, Plt Count 241, MPV 10.0, Gran % 61.0, Lymph % (Auto) 28.0, 

Mono % (Auto) 10.2 H, Eos % (Auto) 0.8 L, Baso % (Auto) 0.0, Gran # 3.19, Lymph 

# (Auto) 1.5, Mono # (Auto) 0.5, Eos # (Auto) 0.0, Baso # (Auto) 0.00











- RAD Interpretation


Radiology Orders: 








06/12/18 10:50


CHEST PORTABLE [RAD] Stat 














- Medication Orders


Current Medication Orders: 








Diphenhydramine HCl (Benadryl)  50 mg IM Q6H PRN


   PRN Reason: agitation/aggression


Haloperidol Lactate (Haldol)  5 mg IM Q6H PRN; Protocol


   PRN Reason: agitation/psychosis


Lorazepam (Ativan)  2 mg IM Q6 PRN; Protocol


   PRN Reason: Agitation





Discontinued Medications





Haloperidol Lactate (Haldol)  5 mg IM STAT STA


   PRN Reason: Protocol


   Stop: 06/13/18 04:54


   Last Admin: 06/13/18 05:40  Dose: 5 mg





IM Administration Charges


 Document     06/13/18 05:40  CNR  (Rec: 06/13/18 05:40  CNR  [id number])


     Injection Site


      MAR Injection Site                         Right Deltoid


     Charges for Administration


      # of IM Administrations                    1





Lorazepam (Ativan)  2 mg IM ONCE ONE


   Stop: 06/13/18 04:53


   Last Admin: 06/13/18 05:40  Dose: 2 mg





IM Administration Charges


 Document     06/13/18 05:40  CNR  (Rec: 06/13/18 05:40  CNR  [id number])


     Injection Site


      MAR Injection Site                         Right Deltoid


     Charges for Administration


      # of IM Administrations                    1





Potassium Chloride (K-Dur 20 Meq Er Tab)  40 meq PO STAT STA


   Stop: 06/12/18 11:19


   Last Admin: 06/12/18 13:26  Dose: 40 meq





Ziprasidone (Geodon Inj)  20 mg IM ONCE STA


   Stop: 06/12/18 20:43


   Last Admin: 06/12/18 20:48  Dose: 20 mg





IM Administration Charges


 Document     06/12/18 20:48  CNR  (Rec: 06/12/18 20:48  CNR  [id number])


     Injection Site


      MAR Injection Site                         Left Deltoid


     Charges for Administration


      # of IM Administrations                    1














- Scribe Statement


The provider has reviewed the documentation as recorded by the Scribe


Riley Mercer.





All medical record entries made by the Scribe were at my direction and 

personally dictated by me. I have reviewed the chart and agree that the record 

accurately reflects my personal performance of the history, physical exam, 

medical decision making, and the department course for this patient. I have 

also personally directed, reviewed, and agree with the discharge instructions 

and disposition.





Disposition/Present on Arrival





- Present on Arrival


Any Indicators Present on Arrival: No


History of DVT/PE: No


History of Uncontrolled Diabetes: No


Urinary Catheter: No


History of Decub. Ulcer: No


History Surgical Site Infection Following: None





- Disposition


Have Diagnosis and Disposition been Completed?: Yes


Diagnosis: 


 Bipolar disorder





Disposition: Transfer [facility]


Disposition Time: 19:00


Patient Plan: Observation


Condition: STABLE


Forms:  [employer] (English)
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
